# Patient Record
Sex: MALE | URBAN - METROPOLITAN AREA
[De-identification: names, ages, dates, MRNs, and addresses within clinical notes are randomized per-mention and may not be internally consistent; named-entity substitution may affect disease eponyms.]

---

## 2018-06-15 PROCEDURE — 99283 EMERGENCY DEPT VISIT LOW MDM: CPT

## 2018-06-16 ENCOUNTER — HOSPITAL ENCOUNTER (EMERGENCY)
Facility: MEDICAL CENTER | Age: 23
End: 2018-06-16
Attending: EMERGENCY MEDICINE

## 2018-06-16 VITALS
OXYGEN SATURATION: 99 % | RESPIRATION RATE: 18 BRPM | SYSTOLIC BLOOD PRESSURE: 150 MMHG | DIASTOLIC BLOOD PRESSURE: 80 MMHG | WEIGHT: 224.87 LBS | BODY MASS INDEX: 34.08 KG/M2 | HEART RATE: 99 BPM | TEMPERATURE: 99.1 F | HEIGHT: 68 IN

## 2018-06-16 DIAGNOSIS — T07.XXXA MULTIPLE CONTUSIONS: ICD-10-CM

## 2018-06-16 RX ORDER — NAPROXEN 500 MG/1
500 TABLET ORAL 2 TIMES DAILY WITH MEALS
Qty: 20 TAB | Refills: 0 | Status: SHIPPED | OUTPATIENT
Start: 2018-06-16 | End: 2018-06-26
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
## 2018-06-16 NOTE — DISCHARGE INSTRUCTIONS
Use the medication that we have prescribed. If you're not noticing steady improvement by Monday, follow up with urgent care or with us.     Jaw Contusion  Introduction  A jaw contusion is a deep bruise of the jaw. Contusions happen when an injury causes bleeding under the skin. The contusion may turn blue, purple, or yellow. Minor injuries will cause a painless bruise, but very bad contusions may be painful and swollen for a few weeks.  Follow these instructions at home:  Diet  · Eat soft foods as told by your doctor. Soft foods include baby food, gelatin, oatmeal, ice cream, applesauce, bananas, eggs, pasta, cottage cheese, soups, and yogurt.  · Cut food into smaller pieces. This makes it easier to chew.  · Avoid chewing gum or ice.  General instructions  · If directed, apply ice to the injured area:  ¨ Put ice in a plastic bag.  ¨ Place a towel between your skin and the bag.  ¨ Leave the ice on for 20 minutes, 2-3 times per day.  · Take over-the-counter and prescription medicines only as told by your doctor.  · Avoid opening your mouth widely. This includes opening your mouth to eat big pieces of food or to yawn, scream, yell, or sing.  · Keep all follow-up visits as told by your doctor. This is important.  Contact a doctor if:  · Your pain is not helped with medicine.  · Your symptoms do not get better with treatment or they get worse.  · You have new symptoms.  Get help right away if:  · You have any new cracking or clicking in your jaw.  · You have trouble eating or you cannot eat.  This information is not intended to replace advice given to you by your health care provider. Make sure you discuss any questions you have with your health care provider.  Document Released: 12/06/2012 Document Revised: 05/25/2017 Document Reviewed: 03/14/2016  © 2017 [employer]
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
